# Patient Record
Sex: FEMALE | Race: WHITE
[De-identification: names, ages, dates, MRNs, and addresses within clinical notes are randomized per-mention and may not be internally consistent; named-entity substitution may affect disease eponyms.]

---

## 2018-09-26 ENCOUNTER — HOSPITAL ENCOUNTER (OUTPATIENT)
Dept: HOSPITAL 5 - TRG | Age: 33
Discharge: HOME | End: 2018-09-26
Attending: OBSTETRICS & GYNECOLOGY
Payer: COMMERCIAL

## 2018-09-26 VITALS — SYSTOLIC BLOOD PRESSURE: 108 MMHG | DIASTOLIC BLOOD PRESSURE: 63 MMHG

## 2018-09-26 DIAGNOSIS — O47.1: Primary | ICD-10-CM

## 2018-09-26 DIAGNOSIS — Z3A.41: ICD-10-CM

## 2018-09-26 PROCEDURE — 59025 FETAL NON-STRESS TEST: CPT

## 2018-09-26 PROCEDURE — 76819 FETAL BIOPHYS PROFIL W/O NST: CPT

## 2018-09-26 PROCEDURE — 76815 OB US LIMITED FETUS(S): CPT

## 2018-09-26 NOTE — ULTRASOUND REPORT
FINAL REPORT



EXAM:  US OB FETAL BPP WO NON-STRESS



HISTORY:  fetal well being 



TECHNIQUE:  Transabdominal sonography of the pelvis.



PRIORS:  None.



FINDINGS:  

Biophysical profile:



Fetal breathing movements: 2/2



Fetal movements: 2/2



Fetal posture and tone: 2/2



Qualitative amniotic fluid volume: 2/2



Total: 8/8



Fetal heart rate 166 beats per minute. 



IMPRESSION:  

1. Biophysical profile as noted above.

## 2018-09-26 NOTE — ULTRASOUND REPORT
FINAL REPORT



EXAM:  US OB LIMITED



HISTORY:  fetal well being 



TECHNIQUE:  Transabdominal sonography of the pelvis.



PRIORS:  2 June 2018.



FINDINGS:  

Limited examination performed for CHOLO determination only. There

is a single, live intrauterine pregnancy in cephalic

presentation. Fetal heart motion is detected and fetal heart rate

is 166 beats per minute. Placenta location not evaluated. 



Biometric data not obtained. Fetal survey not performed. Amniotic

fluid index is 9.4 cm.



Remainder of the uterus and adnexa grossly unremarkable.



IMPRESSION:  

1. Single, live intrauterine pregnancy.



2. CHOLO as noted above.

## 2018-09-27 ENCOUNTER — HOSPITAL ENCOUNTER (INPATIENT)
Dept: HOSPITAL 5 - TRG | Age: 33
LOS: 2 days | Discharge: HOME | End: 2018-09-29
Attending: OBSTETRICS & GYNECOLOGY | Admitting: OBSTETRICS & GYNECOLOGY
Payer: COMMERCIAL

## 2018-09-27 DIAGNOSIS — D64.9: ICD-10-CM

## 2018-09-27 DIAGNOSIS — Z82.49: ICD-10-CM

## 2018-09-27 DIAGNOSIS — Z83.3: ICD-10-CM

## 2018-09-27 DIAGNOSIS — Z79.899: ICD-10-CM

## 2018-09-27 DIAGNOSIS — B00.9: ICD-10-CM

## 2018-09-27 DIAGNOSIS — Z3A.40: ICD-10-CM

## 2018-09-27 LAB
HCT VFR BLD CALC: 33.8 % (ref 30.3–42.9)
HGB BLD-MCNC: 11.4 GM/DL (ref 10.1–14.3)
MCH RBC QN AUTO: 29 PG (ref 28–32)
MCHC RBC AUTO-ENTMCNC: 34 % (ref 30–34)
MCV RBC AUTO: 86 FL (ref 79–97)
PLATELET # BLD: 269 K/MM3 (ref 140–440)
RBC # BLD AUTO: 3.95 M/MM3 (ref 3.65–5.03)

## 2018-09-27 PROCEDURE — 36415 COLL VENOUS BLD VENIPUNCTURE: CPT

## 2018-09-27 PROCEDURE — 85014 HEMATOCRIT: CPT

## 2018-09-27 PROCEDURE — 85018 HEMOGLOBIN: CPT

## 2018-09-27 PROCEDURE — 86850 RBC ANTIBODY SCREEN: CPT

## 2018-09-27 PROCEDURE — 85027 COMPLETE CBC AUTOMATED: CPT

## 2018-09-27 PROCEDURE — 86900 BLOOD TYPING SEROLOGIC ABO: CPT

## 2018-09-27 PROCEDURE — 0HQ9XZZ REPAIR PERINEUM SKIN, EXTERNAL APPROACH: ICD-10-PCS | Performed by: OBSTETRICS & GYNECOLOGY

## 2018-09-27 PROCEDURE — 86901 BLOOD TYPING SEROLOGIC RH(D): CPT

## 2018-09-27 PROCEDURE — 86592 SYPHILIS TEST NON-TREP QUAL: CPT

## 2018-09-27 RX ADMIN — IBUPROFEN SCH MG: 600 TABLET, FILM COATED ORAL at 21:44

## 2018-09-27 NOTE — PROCEDURE NOTE
OB Delivery Note





- Delivery


Date of Delivery: 18 (181)


Surgeon: PRAVIN PORTILLO


Estimated blood loss: 200cc





- Vaginal


Delivery presentation: vertex


Delivery position: OA


Intrapartum events: none


Delivery induction: none


Delivery augmentation: rupture of membranes


Delivery monitor: external FHT, external uterine


Route of delivery: 


Delivery placenta: spontaneous


Delivery cord: nuchal cord, 3 umbilical vessels


Episiotomy: none


Delivery laceration: 1st degree


Delivery repair: vicryl


Anesthesia: local


Delivery comments: 


 of a live 7'8 male infant over a 1st degree vaginal laceration without 

pain control with Apgars of 8 and 9 at 1817 on 2018.  Nuchal cord x 1 

easily manually reduced prior to delivery of the anterior shoulder. Infant 

directly to maternal abd/chest, skin to skin contact.  Delayed cord clamping 

and cutting; Cord cut by the Father of the Baby.  Spontaneous delivery of 

placenta complete and intact with Guerrero side presenting at 1821.  Fundus is 

firm and midline located 4 below the U.  Lochia is scant. Vaginal laceration 

repaired with 2-0 Vicryl on a SH under local 2% Lidocaine. Placenta discarded. 








- Infant


  ** A


Apgar at 1 minute: 8


Apgar at 5 minutes: 9


Infant Gender: Male (7'8)

## 2018-09-28 LAB
HCT VFR BLD CALC: 28.2 % (ref 30.3–42.9)
HGB BLD-MCNC: 9.4 GM/DL (ref 10.1–14.3)

## 2018-09-28 RX ADMIN — IBUPROFEN SCH MG: 600 TABLET, FILM COATED ORAL at 17:15

## 2018-09-28 RX ADMIN — FERROUS SULFATE TAB 325 MG (65 MG ELEMENTAL FE) SCH MG: 325 (65 FE) TAB at 22:11

## 2018-09-28 RX ADMIN — FERROUS SULFATE TAB 325 MG (65 MG ELEMENTAL FE) SCH MG: 325 (65 FE) TAB at 10:48

## 2018-09-28 RX ADMIN — IBUPROFEN SCH MG: 600 TABLET, FILM COATED ORAL at 08:01

## 2018-09-28 NOTE — PROGRESS NOTE
Assessment and Plan





- Patient Problems


(1) Status post normal vaginal delivery


Current Visit: Yes   Status: Acute   


Plan to address problem: 


PPD 1 - stable


Continue routine PP orders


Anticipate discharge in 24-48 hrs








(2) Anemia in puerperium, baby delivered during current episode of care


Current Visit: Yes   Status: Acute   


Plan to address problem: 


Asymptomatic


Iron therapy initiated with Ferrous sulfate 325mg PO BID








Subjective





- Subjective


Date of service: 18


Principal diagnosis: PPD #1, s/p 


Patient reports: appetite normal, voiding normally, pain well controlled, 

ambulating normally, no dizzy ambulation


Harris: doing well





Objective





- Vital Signs


Latest vital signs: 


 Vital Signs











  Temp Temp Pulse Resp BP Pulse Ox


 


 18 06:14  98.4 F   87  18  102/54  95


 


 18 01:06  99.3 F   84  18  101/47  94


 


 18 22:44     18  


 


 18 21:44     20  


 


 18 21:11  99.5 F   86  18  114/58  96


 


 18 18:33  98.6 F     


 


 18 18:31    94 H   114/57 


 


 18 18:30   99.1 F  180 H  52 H  


 


 18 16:21    95 H    94


 


 18 16:19    80    94


 


 18 16:15    92 H    94


 


 18 16:14    89    95


 


 18 16:09    40 L    96


 


 18 15:49    79    93


 


 18 15:46    92 H    92


 


 18 15:42    162 H    91


 


 18 15:41    92 H    95


 


 18 15:36    89    95


 


 18 15:33    80    88


 


 18 15:31    88    96


 


 18 15:28    91 H    94


 


 18 15:26    83    96


 


 18 15:21    84    96


 


 18 15:16    96 H    91


 


 18 15:15    84    94


 


 18 15:11    90    96


 


 18 15:06    81    96


 


 18 15:03    85    94


 


 18 15:01    86    95


 


 18 14:58    84    94


 


 18 14:56    90    93


 


 18 14:51    84    94


 


 18 14:46    81    95


 


 18 14:42    90    88


 


 18 14:41    84    96


 


 18 14:36    90    96


 


 18 14:31    86    95


 


 18 14:30    81    92


 


 18 14:26    88    97








 Intake and Output











 18





 23:59 07:59 15:59


 


Intake Total  120 


 


Output Total  500 


 


Balance  -380 


 


Intake:   


 


  Intake, Free Water  120 


 


Output:   


 


  Urine  500 


 


    Void  500 


 


Other:   


 


  Total, Output Amount  500 


 


  Weight 78.018 kg  


 


  Estimated Blood Loss 200  














- Exam


Abdomen: Present: normal appearance, soft


Vulva: both: laceration/episiotomy (well approximated)


Uterus: Present: normal, firm, fundal height at umbilicus


Extremities: Present: normal





- Labs


Labs: 


 Abnormal lab results











  18 Range/Units





  06:56 


 


Hgb  9.4 L  (10.1-14.3)  gm/dl


 


Hct  28.2 L  (30.3-42.9)  %

## 2018-09-29 VITALS — SYSTOLIC BLOOD PRESSURE: 96 MMHG | DIASTOLIC BLOOD PRESSURE: 57 MMHG

## 2018-09-29 RX ADMIN — IBUPROFEN SCH MG: 600 TABLET, FILM COATED ORAL at 06:03

## 2018-09-29 RX ADMIN — IBUPROFEN SCH: 600 TABLET, FILM COATED ORAL at 00:10

## 2018-09-29 NOTE — DISCHARGE SUMMARY
Providers





- Providers


Date of Admission: 


18 13:14





Date of discharge: 18


Attending physician: 


LAUREN GASTELUM MD





Primary care physician: 


LAUREN GASTELUM MD








Hospitalization


Reason for admission: active labor


Delivery: 


Episiotomy: none


Laceration: 2nd degree


Other postpartum procedures: none


Postpartum complications: none


Discharge diagnosis: IUP at term delivered


 baby: male


Pertinent studies: 


Labs





Hospital course: 


Normal hospital course.





Condition at discharge: Good


Disposition: DC-01 TO HOME OR SELFCARE





- Discharge Diagnoses


(1) Term pregnancy delivered


Status: Acute   





Plan





- Provider Discharge Summary


Activity: routine, no sex for 6 weeks, no heavy lifting 4 weeks, no strenuous 

exercise


Diet: routine


Instructions: routine


Additional instructions: 





Continue your prenatal vitamins and iron supplements. A Rx for Motrin has been 

called to [facility] [address] 


Call your doctor immediately for:


* Fever > 100.5


* Heavy vaginal bleeding ( >1 pad per hour)


* Severe persistent headache


* Shortness of breath


* Reddened, hot, painful area to leg or breast











- Follow up plan


Follow up: 


LAUREN GASTELUM MD [Primary Care Provider] - 6 Weeks

## 2018-09-29 NOTE — PROGRESS NOTE
Assessment and Plan


A: Postpartum day 2 S/P . 


Anemia.


P: Discharge patient home today. Postpartum discharge instructions and warning 

signs discussed in detail with patient. Advised pt. to continue prenatal 

vitamins and iron supplements at home (pt. states she has plenty of both at home

). Rx Motrin 800 mg, #30, 1 po every 8 hours prn pain called to [facility] on [address]. Advised pt. to avoid intercourse, avoid driving, avoid lifting and heavy 

housework. Advised pt. to follow up at [facility] in 6 weeks for 

postpartum exam. Pt. voiced understanding of all instructions. 








Subjective





- Subjective


Date of service: 18


Principal diagnosis: PPD #2, s/p 


Interval history: 


Postpartum day 2 S/P spontaneous vaginal delivery. 


Patient is doing well. She desires discharge today. 


Patient reports small amount of lochia. She is voiding without difficulty and 

ambulating well. She is tolerating a regular diet without nausea or vomiting. 


Patient is breastfeeding and bottlefeeding. She states she does not want to 

start any contraception now. 


Patient denies headache, chest pain cough, shortness of breath, abdominal pain, 

nausea or vomiting, leg pain, heavy vaginal bleeding, or any other problems. 





Patient reports: appetite normal, voiding normally, pain well controlled, flatus

, ambulating normally


[place]: doing well





Objective





- Vital Signs


Latest vital signs: 


 Vital Signs











  Temp Pulse Resp BP BP Pulse Ox


 


 18 06:03    18   


 


 18 00:15  98.7 F  74  18  120/64   97


 


 18 17:08  98.6 F  92 H  18   104/53 








 Intake and Output











 18





 23:59 07:59 15:59


 


Intake Total 120  


 


Balance 120  


 


Intake:   


 


  Oral 120  


 


Other:   


 


  Total, Intake Amount 120  














- Exam


Cardiovascular: Present: Regular rate, Normal S1, Normal S2


Lungs: Present: Clear to auscultation


Abdomen: Present: normal appearance, soft, normal bowel sounds.  Absent: 

distention, tenderness, guarding


Uterus: Present: normal, firm, fundal height below umbilicus.  Absent: bogginess

, tenderness


Extremities: Present: normal.  Absent: tenderness, edema

## 2022-03-18 NOTE — HISTORY AND PHYSICAL REPORT
History of Present Illness


Date of examination: 18


Date of admission: 


18 13:14





Chief complaint: 


Intense Labor Pains





History of present illness: 


Early entry to prenatal care, co-managed with [facility] due to strong family hx of 

cardiac defects. Prenatal course also complicated by 3 UTIs, known hx of HSV II 

(taking valtrex supprssion), and Anemia.








Past History


Past Medical History: no pertinent history


Past Surgical History: no surgical history


GYN History: herpes


Family/Genetic History: diabetes, hypertension


Social history: no significant social history





- Obstetrical History


Expected Date of Delivery: 18


Actual Gestation: 40 Week(s) 6 Day(s) 


: 4


Para: 2


Hx # Term Pregnancies: 2


Spontaneous Abortions: 1


Number of Living Children: 2


  ** #1


Infant Gender: Male


Birth year: 2[birth year]


Birthweight: 3.175 kg


Method of Delivery: Vaginal


Gestational age at delivery: 40


Complications: none





  ** #2


Infant Gender: Male


Birth year: [birth year]


Birthweight: 3.657 kg


Method of Delivery: Vaginal


Complications: other (VSD and GI anomalie)





Medications and Allergies


 Allergies











Allergy/AdvReac Type Severity Reaction Status Date / Time


 


No Known Allergies Allergy   Verified 18 17:12











 Home Medications











 Medication  Instructions  Recorded  Confirmed  Last Taken  Type


 


Cholecalciferol (Vitamin D3) 1 tab PO QWEEK 18 09:00 

History





[Vitamin D3]    1 


 


Prenatal Vit-Fe Fumar-FA [Prenatal 1 tab PO QDAY 18 09:

00 History





Vitamin]    1 


 


Pyridoxine HCl (Vitamin B6) 1 tab PO DAILY 18 09:00 

History





[Vitamin B-6]    1 











Active Meds: 


Active Medications





Ampicillin Sodium (Ampicillin/Ns 1 Gm/50 Ml)  1 gm in 50 mls @ 100 mls/hr IV 

Q4HR SATISH; Protocol


Ampicillin Sodium (Polycillin/Ns 2 Gm/100 Ml)  2 gm in 100 mls @ 100 mls/hr IV 

ONCE ONE; Protocol


   Stop: 18 19:45


Lactated Ringer's (Lactated Ringers)  1,000 mls @ 125 mls/hr IV AS DIRECT SATISH


Oxytocin/Sodium Chloride (Pitocin/Ns 20 Unit/1000ml Drip)  20 units in 1,000 

mls @ 125 mls/hr IV AS DIRECT SATISH


Oxytocin/Sodium Chloride (Pitocin/Ns 30 Unit/500ml)  30 units in 500 mls @ 1 mls

/hr IV TITR SATISH; Protocol


Lidocaine (Xylocaine 2%)  20 ml INFILTRATI ONCE ONE


   Stop: 18 18:47


Mineral Oil (Mineral Oil)  30 ml PO QHS PRN


   PRN Reason: Constipation











Review of Systems


All systems: negative





- Vital Signs


Vital signs: 


 Vital Signs











Pulse Pulse Ox


 


 88   97 


 


 18 14:26  18 14:26








 











Temp Pulse Resp BP Pulse Ox


 


 98.6 F   94 H     114/57   94 


 


 18 18:33  18 18:31     18 18:31  18 16:21














- Physical Exam


Breasts: Positive: normal


Cardiovascular: Regular rate


Lungs: Positive: Clear to auscultation, Normal air movement


Abdomen: Positive: normal appearance, soft, normal bowel sounds


Genitourinary (Female): Positive: normal external genitalia, normal perenium


Vagina: Positive: normal moisture


Uterus: Positive: enlarged


Anus/Rectum: Positive: normal perianal skin


Extremities: Positive: normal





- Obstetrical


FHR: category 1


Uterine Contraction Monitor Mode: External


Cervical Dilatation: 9 (AROM of a small amount of clear fluid at 1813)


Cervical Effacement Percentage: 100


Fetal station: 0


Uterine Contraction Pattern: Regular


Uterine Tone Measurement Phase: Resting


Uterine Contraction Intensity: Strong/Firm





Results


All other labs normal.








Assessment and Plan





A: IUP @ 40 6/7 Weeks


    Category I Tracing


    Active Labor


    GBS Positive





P: Admit to L&D per Routine Orders


    GBS Prophylaxis 10x5\" 10x5\" 10x10\" 10x10\" 10x10\"   Bridges   10x 10 x 2x10                                              STANDING        HS curls  15x B 15x B 15x B 15x B 15x B   HS/slant board stretch  1 min  2x1' 2x1' 2x1'   3 way hip  2x10 B 2x10 B 2x10 ea way B 2x10 ea way B 2x10 ea way B   Heel raises  2x10 2x10 2x10 3 x10 3x10    Step taps  6\" x 1 min  6\" x1 min  At steps 2' At steps 2' At steps 2'   High knee marches     3 laps                                                PROPRIOCEPTION                                                MODALITIES                            Other Therapeutic Activities/Education:  Pt educated on findings, prognosis, POC and HEP. Pt verbalized understanding, and all questions answered at this date. Home Exercise Program:  2/24/22  Seated marching x15, TA contractions 15x5\", glut sets 15x5\", HS curls x15    Manual Treatments:  None. Modalities:  IFC 80-120hz 15' to low back area      Communication with other providers:  eval faxed.      Assessment:  (Response towards treatment session) (Pain Rating)  Patients denies pain but does feel his muscles have loosened up at the end of the treatment     Plan for Next Session:  Continue per POC,  Attempt Estim if no pacemaker    Time In / Time Out:   2211-3763    Timed Code/Total Treatment Minutes:   59  15ifc 15ta 34te    Next Progress Note due: April 8, 2022       Plan of Care Interventions:  [x] Therapeutic Exercise  [x] Modalities:  [x] Therapeutic Activity     [] Ultrasound  [x] Estim  [] Gait Training      [] Cervical Traction [] Lumbar Traction  [] Neuromuscular Re-education    [] Cold/hotpack [] Iontophoresis   [x] Instruction in HEP      [] Vasopneumatic   [] Dry Needling    [x] Manual Therapy               [] Aquatic Therapy              Electronically signed by:  Kenna Aguillon PTA  3/18/2022, 1:07 PM